# Patient Record
Sex: FEMALE | Race: WHITE | HISPANIC OR LATINO | Employment: FULL TIME | URBAN - METROPOLITAN AREA
[De-identification: names, ages, dates, MRNs, and addresses within clinical notes are randomized per-mention and may not be internally consistent; named-entity substitution may affect disease eponyms.]

---

## 2024-08-20 ENCOUNTER — OFFICE VISIT (OUTPATIENT)
Dept: URGENT CARE | Facility: URGENT CARE | Age: 29
End: 2024-08-20
Payer: COMMERCIAL

## 2024-08-20 VITALS
SYSTOLIC BLOOD PRESSURE: 133 MMHG | RESPIRATION RATE: 18 BRPM | DIASTOLIC BLOOD PRESSURE: 95 MMHG | HEART RATE: 111 BPM | TEMPERATURE: 99 F | WEIGHT: 147.6 LBS | OXYGEN SATURATION: 98 %

## 2024-08-20 DIAGNOSIS — U07.1 INFECTION DUE TO 2019 NOVEL CORONAVIRUS: Primary | ICD-10-CM

## 2024-08-20 PROCEDURE — 99203 OFFICE O/P NEW LOW 30 MIN: CPT

## 2024-08-20 RX ORDER — BENZONATATE 200 MG/1
200 CAPSULE ORAL 3 TIMES DAILY PRN
Qty: 21 CAPSULE | Refills: 0 | Status: SHIPPED | OUTPATIENT
Start: 2024-08-20

## 2024-08-20 NOTE — PROGRESS NOTES
ASSESSMENT:  (U07.1) Infection due to 2019 novel coronavirus  (primary encounter diagnosis)  Plan: nirmatrelvir and ritonavir (PAXLOVID) 300         mg/100 mg therapy pack, benzonatate (TESSALON)         200 MG capsule    PLAN:  Informed the patient to quarantine for 24 hours and until fever free.  We discussed taking Paxlovid as prescribed. Instructed her to take the benzonatate as prescribed, get plenty of rest, drink fluids and use Tylenol as needed for pain and fever with a maximum dose being 4000 mg in a 24-hour period of time.  Work note provided.  We discussed trying warm salt water gargles, hot/warm water or tea with honey and/or lemon and/or Cepacol lozenges or spray for her sore throat.  We also discussed the need to have the patient follow a bland diet and advance as tolerated.  Informed the patient that a bland diet can consist of any of the following: Broiled/boiled starches such as potatoes, noodles or rice and/or cooked soft cereals such as wheat or oats with some salt but not any spice.  Crackers, bananas, toast, yogurt, soups, and boiled vegetables can also be included.   Discussed the need to avoid spicy, greasy, fatty and sugary foods.  Informed the patient/mom/dad that smaller, more frequent meals are better than trying to eat a lot at once.  Discussed the need to drink a minimum of 70 ounces of water per day.  Informed the patient/mom/dad that some of this total volume can be Pedialyte.  Discussed the need to avoid Gatorade and Powerade as these are high in sugar content.  Informed her to return to clinic with any new or worsening symptoms.  Patient acknowledged her understanding of the above plan.    The use of Dragon/AGELON ? dictation services may have been used to construct the content in this note; any grammatical or spelling errors are non-intentional. Please contact the author of this note directly if you are in need of any clarification.      Prosper Landa, ANTONIO  CNP      SUBJECTIVE:   Lucy Portillo is a 29 year old female presenting with a chief complaint of fever, runny nose, stuffy nose, cough - non-productive, ear pain right, sore throat, headache, body aches, fatigue, and diarrhea.  Onset of symptoms was 5 day(s) ago.  Course of illness is worsening.    Patient denies: vomiting  Treatment measures tried include theraflu and Advil.  Predisposing factors include exposure to COVID.    Positive at home COVID test.     ROS:  Negative except noted above.    OBJECTIVE:  BP (!) 133/95   Pulse 111   Temp 99  F (37.2  C) (Tympanic)   Resp 18   Wt 67 kg (147 lb 9.6 oz)   LMP 07/20/2024 (Approximate)   SpO2 98%   Breastfeeding No   GENERAL APPEARANCE: healthy, alert and no distress  EYES: EOMI,  PERRL, conjunctiva clear  HENT: ear canals and TM's normal.  Nose and mouth without ulcers, erythema or lesions  NECK: supple, nontender, no lymphadenopathy  RESP: lungs clear to auscultation - no rales, rhonchi or wheezes  CV: regular rates and rhythm, normal S1 S2, no murmur noted  ABDOMEN:  soft, nontender, no HSM or masses and bowel sounds normal  SKIN: no suspicious lesions or rashes

## 2024-08-20 NOTE — PATIENT INSTRUCTIONS
Quarantine for 24 hours and until fever free.  Take Paxlovid as prescribed.  Get plenty of rest and drink fluids.  Can use Tylenol as needed for pain and fever.  Maximum dose of Tylenol is 4000mg in a 24 hour period of time.  You can also try warm salt water gargles, hot/warm water or tea with honey and/or lemon and/or Cepacol lozenges or spray for your sore throat.  Take benzonatate as prescribed for the cough.  Follow a bland diet and advance as tolerated.   [place] diet can consist of any of the following: Broiled/boiled starches such as potatoes, noodles or rice and/or cooked soft cereals such as wheat or oats with some salt but not any spice.  Crackers, bananas, toast, yogurt, soups, and boiled vegetables can also be included.   Avoid spicy, greasy, fatty and sugary foods.  Smaller, more frequent meals are better than trying to eat a lot at once.  Drink a minimum of 70 ounces of water per day.  Some of this total volume can be Pedialyte.  Avoid Gatorade and Powerade as these are high in sugar content.

## 2024-08-20 NOTE — LETTER
August 20, 2024      Lucy Portillo  [address]   [place] [address]        To Whom It May Concern:    Lucy Portillo  was seen on August 20, 2024.  Please excuse her from work until August 26th due to illness.        Sincerely,        Prosper Landa, ANTONIO CNP